# Patient Record
(demographics unavailable — no encounter records)

---

## 2017-03-27 NOTE — CT
EXAM DATE: 17



PATIENT'S AGE: 60



Patient: JC NUNES



Facility: [place], [place]

Patient ID: [id number]

Site Patient ID: [id number].

Site Accession #: [id number]

: 1956

Study: CT Head [id number]/27/2017 5:56:27 AM

Ordering Physician: Doctor Braswell



Final Report: 

INDICATION: DIZZINESS/NEAR SYNCOPE, LT SIDED WEAKNESS



TECHNIQUE:

CT Head without contrast.



COMPARISON:

None.



FINDINGS:

CSF spaces: Within normal limits for age.

Brain parenchyma: The gray-white differentiation is normal. No sign of mass, 
hemorrhage, or midline shift.

Skull base and calvarium: Mucosal thickening is present in the maxillary 
sinuses. The visualized orbits are grossly unremarkable. No skull fractures.



IMPRESSION:

Unremarkable noncontrast head CT. No sign of CVA or other significant finding.



Dictated by: Neel Almaraz MD @ 2017 06:16:47

(Electronic Signature)



Report Signed by Proxy and Original Signed Document filed in the

Medical Record.
[facility]

## 2017-03-27 NOTE — CR
EXAM DATE: 17



PATIENT'S AGE: 60



Patient: JC NUNES



Facility: [place], [place]

Patient ID: [id number]

Site Patient ID: [id number].

Site Accession #: [id number]

: 1956

Study: XRay Chest [id number]/27/2017 5:55:26 AM

Ordering Physician: Doctor Braswell



Final Report: 

INDICATION: LT ARM WEAKNESS, DIZZINESS/NEAR SYNCOPE



TECHNIQUE:

Chest 1 view. 



COMPARISON:

2014



FINDINGS:

Cardiovascular and mediastinum: Heart size and vasculature are normal in 
caliber and appearance. Mediastinum is within normal limits. 



Lungs and pleural space: Lungs are clear. No sign of infiltrate or mass. No 
sign of pleural effusion. No pneumothorax. 



Bones and soft tissues: No significant findings. 



IMPRESSION:

Unremarkable chest.



Dictated by: Neel Almaraz MD @ 2017 06:08:56

(Electronic Signature)



 

Report Signed by Proxy and Original Signed Document filed in the

Medical Record.
[facility]DD

## 2017-03-27 NOTE — PCM.HP
H&P History of Present Illness





- General


Date of Service: 03/27/17


Admit Problem/Dx: 


 Admission Diagnosis/Problem





Admission Diagnosis/Problem      Near syncope








Source of Information: Patient, Old records, Provider





- History of Present Illness


Initial Comments - Free Text/Narative: 





He was seen in the ED this am . He states that recently he has had a sore 

throat and cough. His cough has been productive of discolored sputum


He is a . He stopped and felt nauseated. He threw up and felt very 

dizzy. He had a blood pressure cuff in the 


cab of his truck and took his blood pressure. It was 88 mm Hg systolic.





 He has a history of HTN and takes Hyzaar. He also takes flomax.


 He sometimes skips his blood pressure medicine if his blood pressure is low.





His last dose of Hyzaar was on Saturday. He takes Flomax for prostate problems. 

He denies pain. He denies fever or diarrhea.





- Related Data


Allergies/Adverse Reactions: 


 Allergies











Allergy/AdvReac Type Severity Reaction Status Date / Time


 


hydrocodone Allergy  Vomiting Verified 03/27/17 05:16











Home Medications: 


 Home Meds





Losartan/Hydrochlorothiazide [Losartan-HCTZ 100-25 MG] 1 tab PO DAILY 03/27/17 [

History]


Tamsulosin [Flomax] 0.4 mg PO PCDINNER 03/27/17 [History]


amLODIPine [Norvasc] 5 mg PO DAILY 03/27/17 [History]











Past Medical History


HEENT History: Reports: Impaired vision


Cardiovascular History: Reports: Hypertension.  Denies: Bypass, CAD, 

Cardiomyopathy, Heart Failure, MI


Respiratory History: Reports: None.  Denies: COPD


Gastrointestinal History: Denies: Cirrhosis


Genitourinary History: Reports: BPH, Urinary incontinence, Other (see below) (

He states that he has had problems with urinary frequency related to prostate 

trouble. He has noticed improvement with Flomax.)


Musculoskeletal History: Reports: Amputation


Neurological History: Denies: Alzheimers disease, CVA


Endocrine/Metabolic History: Denies: Diabetes, type I, Diabetes, type II


Oncologic (Cancer) History: Reports: None





- Past Surgical History


Male  Surgical History: Reports: Circumcision


Other Male  Surgeries/Procedures: bladder surgery


Musculoskeletal Surgical History: Reports: Amputation, Other (see below)


Other Musculoskeletal Surgeries/Procedures:: left finger; knee surgery





Social & Family History





- Family History


Family Medical History: Noncontributory





- Tobacco Use


Smoking Status *Q: Never Smoker


Second Hand Smoke Exposure: No





- Caffeine Use


Caffeine Use: Reports: None





- Alcohol Use


Days Per Week of Alcohol Use: 0





- Recreational Drug Use


Recreational Drug Use: No





H&P Review of Systems





- Review of Systems:


Review Of Systems: See Below


General: Denies: fever, chills


HEENT: Reports: sore throat.  Denies: dysphasia, sinus congestion


Pulmonary: Reports: Other (He notes that he generally does not feel as well 

when he takes his blood pressure medicine. Sometimes  he thinks it makes him 

tired.)


Gastrointestinal: Denies: Abdominal pain


Genitourinary: Denies: dysuria, hematuria


Skin: Denies: cyanosis


Psychiatric: Denies: confusion





Exam





- Exam


Exam: See Below





- Vital Signs


Vital Signs: 


 Last Vital Signs











Temp  96.6 F   03/27/17 05:16


 


Pulse  90   03/27/17 07:30


 


Resp  16   03/27/17 07:30


 


BP  108/74   03/27/17 07:30


 


Pulse Ox  98   03/27/17 07:30








 





Orthostatic Blood Pressure [     93/59


Standing]                        


Orthostatic Blood Pressure [     108/68


Sitting]                         


Orthostatic Blood Pressure [     114/70


Supine]                          








Weight: 77.111 kg





- Exam


General: alert, oriented, sedated


HEENT: EOMI


Neck: supple, trachea midline


Lungs: Clear to auscultation, Normal respiratory effort


Cardiovascular: regular rate, regular rhythm


Abdomen: soft, other (Mild tenderness along the costal margin bilaterally)


Extremities: No: edema


Neurological: cranial nerves intact, normal speech


Neuro Extensive - Motor, Sensory, Reflexes: No: facial palsy (L), facial palsy (

R), hemiplagia (L), abnormal Romberg


Psychiatric: No: agitated





- Patient Data


Result Diagrams: 


 03/27/17 05:20





 03/27/17 05:20





*Q Meaningful Use (ADM)





- VTE *Q


VTE Criteria *Q: 








- Stroke *Q


Stroke Criteria *Q: 








- AMI *Q


AMI Criteria *Q: 








- Problem List


(1) Near syncope


SNOMED Code(s): 947473408


   ICD Code: R55 - SYNCOPE AND COLLAPSE   Status: Acute   Current Visit: Yes   


Problem List Initiated/Reviewed/Updated: Yes


Orders Last 24hrs: 


 Active Orders 24 hr











 Category Date Time Status


 


 Oxygen Therapy [RC] PRN Care  03/27/17 09:41 Ordered


 


 VTE/DVT Education [RC] PER UNIT ROUTINE Care  03/27/17 09:41 Ordered


 


 Vital Signs [RC] Q4H Care  03/27/17 09:41 Ordered


 


 Regular Diet [DIET] Diet  03/27/17 Lunch Active


 


 CBC WITH AUTO DIFF [HEME] AM Lab  03/28/17 05:11 Ordered


 


 COMPREHENSIVE METABOLIC PN,CMP [CHEM] AM Lab  03/28/17 05:11 Ordered


 


 MAGNESIUM [CHEM] Routine Lab  03/27/17 09:41 Ordered


 


 STREP SCRN A RAPID W CULT CONF [RM] Stat Lab  03/27/17 09:39 Uncollected


 


 Acetaminophen [Tylenol] Med  03/27/17 09:41 Ordered





 650 mg PO Q4H PRN   


 


 Bisacodyl [Dulcolax] Med  03/27/17 09:41 Ordered





 5 mg PO DAILY PRN   


 


 Levofloxacin/Dextrose 5%-Water [Levaquin in D5W 500 MG/ Med  03/27/17 09:45 

Active





 100 ML] 500 mg   





 Premix Bag 1 bag   





 IV Q24H   


 


 Ondansetron [Zofran] Med  03/27/17 09:41 Ordered





 4 mg IVPUSH Q4H PRN   


 


 Sodium Chloride 0.9% [Normal Saline] 1,000 ml Med  03/27/17 09:45 Active





 IV ASDIRECTED   


 


 Tamsulosin [Flomax] Med  03/27/17 18:00 Active





 0.4 mg PO PCDINNER   


 


 Temazepam [Restoril] Med  03/27/17 09:41 Ordered





 15 mg PO BEDTIME PRN   


 


 Resuscitation Status Routine Resus Stat  03/27/17 09:41 Ordered








 Medication Orders





Levofloxacin/Dextrose 500 mg/ (Premix)  100 mls @ 100 mls/hr IV Q24H [facility]


Sodium Chloride (Normal Saline)  1,000 mls @ 150 mls/hr IV ASDIRECTED [facility]


Tamsulosin HCl (Flomax)  0.4 mg PO PCDINNER [facility]








Assessment/Plan Comment:: 





Will check a throat swab. I suspect his transient hypotension may have been 

multifactorial. It occurred after vomiting. It may be that he had a vagal 

reaction. Also contribute factors may be an acute illness and his use of 

antihypertensives. Will monitor on observation with telemetry. As he's had a 

recent cough productive of discolored sputum Will cover with Levaquin as per 

orders.

## 2017-03-27 NOTE — EDM.PDOC
ED HPI GENERAL MEDICAL PROBLEM





- General


Chief Complaint: Syncope


Stated Complaint: AMBULANCE


Time Seen by Provider: 03/27/17 06:02





- History of Present Illness


INITIAL COMMENTS - FREE TEXT/NARRATIVE: 





HISTORY AND PHYSICAL:





History of present illness:


Patient is 60-year-old white male who presents with return of near syncope he 

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 

felt like his pass out he checked his blood pressure was 80/40 at that time he 

was diaphoretic nauseous did not vomit denied chest pain abdominal pain or 

other concern





Review of systems: 


As per history of present illness and below otherwise all systems reviewed and 

negative.





Past medical history: 


As per history of present illness and as reviewed below otherwise 

noncontributory.





Surgical history: 


As per history of present illness and as reviewed below otherwise 

noncontributory.





Social history: 


No reported history of drug or alcohol abuse.





Family history: 


As per history of present illness and as reviewed below otherwise 

noncontributory.





Physical exam:


HEENT: Atraumatic, normocephalic, pupils reactive, negative for conjunctival 

pallor or scleral icterus, mucous membranes moist, throat clear, neck supple, 

nontender, trachea midline.


Lungs: Clear to auscultation, breath sounds equal bilaterally, chest nontender.


Heart: S1S2, regular, negative for clicks, rubs, or JVD.


Abdomen: Soft, nondistended, nontender. Negative for masses or 

hepatosplenomegaly. Negative for costovertebral tenderness.


Pelvis: Stable nontender.


Genitourinary: Deferred.


Rectal: Deferred.


Extremities: Atraumatic, negative for cords or calf pain. Neurovascular 

unremarkable.


Neuro: Awake, alert, oriented. Cranial nerves II through XII unremarkable. 

Cerebellum unremarkable. Motor and sensory unremarkable throughout. Exam 

nonfocal.





Diagnostics:


CBC CMP troponin PT INR EKG chest x-ray CT brain





Therapeutics:


Normal saline 1 L bolus O2 monitor





Impression: 


#1 near syncope





Definitive disposition and diagnosis as appropriate pending reevaluation and 

review of above.


Treatments PTA: Reports: IV/IO, Oxygen





- Related Data


 Allergies











Allergy/AdvReac Type Severity Reaction Status Date / Time


 


hydrocodone Allergy  Vomiting Verified 03/27/17 05:16











Home Meds: 


 Home Meds





Losartan/Hydrochlorothiazide [Losartan-HCTZ 100-25 MG] 1 mg PO DAILY 03/27/17 [

History]


Tamsulosin [Flomax] 0.4 mg PO DAILY 03/27/17 [History]


amLODIPine [Norvasc] 5 mg PO DAILY 03/27/17 [History]











Past Medical History


HEENT History: Reports: Impaired vision


Cardiovascular History: Reports: Hypertension





- Past Surgical History


Other Male  Surgeries/Procedures: bladder surgery


Musculoskeletal Surgical History: Reports: Amputation, Other (see below)


Other Musculoskeletal Surgeries/Procedures:: left finger; knee surgery





Social & Family History





- Family History


Family Medical History: Noncontributory





- Tobacco Use


Smoking Status *Q: Never Smoker


Second Hand Smoke Exposure: No





- Caffeine Use


Caffeine Use: Reports: None





- Alcohol Use


Days Per Week of Alcohol Use: 0





- Recreational Drug Use


Recreational Drug Use: No





ED ROS GENERAL





- Review of Systems


Review Of Systems: ROS reveals no pertinent complaints other than HPI.





ED EXAM, GENERAL





- Physical Exam


Exam: See Below (See dictated)





Course





- Vital Signs


Last Recorded V/S: 





 Last Vital Signs











Temp  35.9 C   03/27/17 05:16


 


Pulse  69   03/27/17 05:55


 


Resp  18   03/27/17 05:55


 


BP  119/71   03/27/17 05:55


 


Pulse Ox  98   03/27/17 05:55














- Orders/Labs/Meds


Orders: 





 Active Orders 24 hr











 Category Date Time Status


 


 EKG 12 Lead [EKG Documentation Completion] [RC] STAT Care  03/27/17 05:07 

Active


 


 Chest 1V Frontal [CR] Stat Exams  03/27/17 05:05 Taken


 


 Head wo Cont [CT] Stat Exams  03/27/17 05:05 Taken











Labs: 





 Laboratory Tests











  03/27/17 03/27/17 03/27/17 Range/Units





  05:20 05:20 05:20 


 


WBC  7.34    (4.0-11.0)  K/uL


 


RBC  5.12    (4.50-5.90)  M/uL


 


Hgb  15.5    (13.0-17.0)  g/dL


 


Hct  45.0    (38.0-50.0)  %


 


MCV  87.9    (80.0-98.0)  fL


 


MCH  30.3    (27.0-32.0)  pg


 


MCHC  34.4    (31.0-37.0)  g/dL


 


RDW Std Deviation  40.2    (28.0-62.0)  fl


 


RDW Coeff of Vic  13    (11.0-15.0)  %


 


Plt Count  211    (150-400)  K/uL


 


MPV  9.70    (7.40-12.00)  fL


 


Neut % (Auto)  72.9    (48.0-80.0)  %


 


Lymph % (Auto)  11.0 L    (16.0-40.0)  %


 


Mono % (Auto)  14.2    (0.0-15.0)  %


 


Eos % (Auto)  1.5    (0.0-7.0)  %


 


Baso % (Auto)  0.4    (0.0-1.5)  %


 


Neut # (Auto)  5.4    (1.4-5.7)  K/uL


 


Lymph # (Auto)  0.8    (0.6-2.4)  K/uL


 


Mono # (Auto)  1.0 H    (0.0-0.8)  K/uL


 


Eos # (Auto)  0.1    (0.0-0.7)  K/uL


 


Baso # (Auto)  0.0    (0.0-0.1)  K/uL


 


Nucleated RBC %  0.0    /100WBC


 


Nucleated RBCs #  0    K/uL


 


Sodium   138   (136-146)  mmol/L


 


Potassium   3.8   (3.5-5.1)  mmol/L


 


Chloride   102   ()  mmol/L


 


Carbon Dioxide   22   (21-31)  mmol/L


 


BUN   16   (6.0-23.0)  mg/dL


 


Creatinine   1.4   (0.6-1.5)  mg/dL


 


Est Cr Clr Drug Dosing   54.29   mL/min


 


Estimated GFR (MDRD)   51.7   ml/min


 


Glucose   193 H   ()  mg/dL


 


Calcium   9.1   (8.8-10.8)  mg/dL


 


Total Bilirubin   1.0   (0.1-1.5)  mg/dL


 


AST   18   (5-40)  IU/L


 


ALT   20   (8-54)  IU/L


 


Alkaline Phosphatase   81   ()  


 


Troponin I    < 0.10  (0.0-0.29)  NG/ML


 


Total Protein   6.9   (6.0-8.0)  g/dL


 


Albumin   4.0   (3.4-4.8)  g/dL


 


Globulin   2.9   (2.0-3.5)  g/dL


 


Albumin/Globulin Ratio   1.4   (1.3-2.8)  














Departure





- Departure


Time of Disposition: 07:04


Disposition: Refer to Observation


Condition: good


Clinical Impression: 


 Near syncope





Forms:  ED Department Discharge





- My Orders


Last 24 Hours: 





My Active Orders





03/27/17 05:05


Chest 1V Frontal [CR] Stat 


Head wo Cont [CT] Stat 





03/27/17 05:07


EKG 12 Lead [EKG Documentation Completion] [RC] STAT 














- Assessment/Plan


Last 24 Hours: 





My Active Orders





03/27/17 05:05


Chest 1V Frontal [CR] Stat 


Head wo Cont [CT] Stat 





03/27/17 05:07


EKG 12 Lead [EKG Documentation Completion] [RC] STAT

## 2017-03-28 NOTE — PCM.DCSUM1
**Discharge Summary





- Hospital Course


Brief History: he was admitted with a hypotensive episode associated with 

vomiting and presyncope.





- Discharge Data


Discharge Date: 03/28/17


Discharge Disposition: Home, Self-Care 01


Condition: Fair





- Discharge Diagnosis/Problem(s)


(1) Near syncope


SNOMED Code(s): 604271437


   ICD Code: R55 - SYNCOPE AND COLLAPSE   Status: Acute   Current Visit: Yes   





- Patient Summary/Data


Hospital Course: 





His antihypertensives were held. He was given IVF hydration


He is improved at discharge and is asymptomatic at discharge and  he is 

normotensive.


He has been started on levaquin po for bronchitis


telemetry showed NSR with rare PVC reported.





Impression:


hypotension transient with likely multifactorial etiology


history of HTN


history of flomax use for nocturia thought related to prostate enlargement


bronchitis





Plan


follow up with Dr Salvador


home blood pressure monitoring


levaquin 500 mg po daily x 6 days


off work ( he is a  ) until Monday April 3, 2017.


stop hyzaar for now





Aly Bledsoe MD





- Discharge Plan


Home Medications: 


 Home Meds





Losartan/Hydrochlorothiazide [Losartan-HCTZ 100-25 MG] 1 tab PO DAILY 03/27/17 [

History]


Tamsulosin [Flomax] 0.8 mg PO PCDINNER 03/27/17 [History]


amLODIPine [Norvasc] 5 mg PO DAILY 03/27/17 [History]








Forms:  ED Department Discharge


Referrals: 


PCP,None [Primary Care Provider] - 





- Patient Data


Vitals - Most Recent: 


 Last Vital Signs











Temp  98.6 F   03/28/17 11:24


 


Pulse  75   03/28/17 11:24


 


Resp  16   03/28/17 11:24


 


BP  133/79   03/28/17 11:24


 


Pulse Ox  96   03/28/17 07:54








 





Orthostatic Blood Pressure [     93/59


Standing]                        


Orthostatic Blood Pressure [     108/68


Sitting]                         


Orthostatic Blood Pressure [     114/70


Supine]                          








Weight - Most Recent: 77.111 kg


I&O - Last 24 hours: 


 Intake & Output











 03/27/17 03/28/17 03/28/17





 22:59 06:59 14:59


 


Intake Total 425 1300 1000


 


Output Total 175 765 


 


Balance 











Lab Results - Last 24 hrs: 


 Laboratory Results - last 24 hr











  03/28/17 03/28/17 Range/Units





  05:00 05:00 


 


WBC  8.49   (4.0-11.0)  K/uL


 


RBC  4.51   (4.50-5.90)  M/uL


 


Hgb  13.5   (13.0-17.0)  g/dL


 


Hct  40.0   (38.0-50.0)  %


 


MCV  88.7   (80.0-98.0)  fL


 


MCH  29.9   (27.0-32.0)  pg


 


MCHC  33.8   (31.0-37.0)  g/dL


 


RDW Std Deviation  40.8   (28.0-62.0)  fl


 


RDW Coeff of Vic  13   (11.0-15.0)  %


 


Plt Count  179   (150-400)  K/uL


 


MPV  9.60   (7.40-12.00)  fL


 


Add Manual Diff  YES   


 


Neutrophils % (Manual)  66   (48.0-80.0)  %


 


Lymphocytes % (Manual)  24   (16.0-40.0)  %


 


Monocytes % (Manual)  7   (0.0-15.0)  %


 


Eosinophils % (Manual)  2   (0.0-7.0)  %


 


Basophils % (Manual)  1   (0.0-1.5)  %


 


Nucleated RBC %  0.0   /100WBC


 


Absolute Seg Neuts  5.6   


 


Lymphocytes # (Manual)  2.0   


 


Monocytes # (Manual)  0.6   


 


Eosinophils # (Manual)  0.2   


 


Basophils # (Manual)  0   


 


Nucleated RBCs #  0   K/uL


 


Sodium   140  (136-146)  mmol/L


 


Potassium   3.5  (3.5-5.1)  mmol/L


 


Chloride   111 H  ()  mmol/L


 


Carbon Dioxide   21  (21-31)  mmol/L


 


BUN   20  (6.0-23.0)  mg/dL


 


Creatinine   0.9  (0.6-1.5)  mg/dL


 


Est Cr Clr Drug Dosing   84.44  mL/min


 


Estimated GFR (MDRD)   > 60.0  ml/min


 


Glucose   93  ()  mg/dL


 


Calcium   7.2 L  (8.8-10.8)  mg/dL


 


Total Bilirubin   0.8  (0.1-1.5)  mg/dL


 


AST   15  (5-40)  IU/L


 


ALT   16  (8-54)  IU/L


 


Alkaline Phosphatase   63  ()  


 


Total Protein   5.1 L  (6.0-8.0)  g/dL


 


Albumin   3.2 L  (3.4-4.8)  g/dL


 


Globulin   1.9 L  (2.0-3.5)  g/dL


 


Albumin/Globulin Ratio   1.7  (1.3-2.8)  











JULITA Results - Last 24 hrs: 


 Microbiology











 03/27/17 10:00 Group A Streptococcus Rapid Screen - Final





 Throat    NEGATIVE STREP A SCREEN











Med Orders - Current: 


 Current Medications





Acetaminophen (Tylenol)  650 mg PO Q4H PRN


   PRN Reason: Pain (Mild 1-3)/fever


Bisacodyl (Dulcolax)  5 mg PO DAILY PRN


   PRN Reason: Constipation


Sodium Chloride (Normal Saline)  1,000 mls @ 150 mls/hr IV ASDIRECTED [facility]


   Last Admin: 03/28/17 08:12 Dose:  150 mls/hr


Levofloxacin (Levaquin)  500 mg PO Q24H [facility]


   Last Admin: 03/28/17 10:55 Dose:  500 mg


Ondansetron HCl (Zofran)  4 mg IVPUSH Q4H PRN


   PRN Reason: Nausea


Tamsulosin [facility] ([facility]omax)  [facility]


Temazepam (Restoril)  15 mg PO BEDTIME PRN


   PRN Reason: Sleep





Discontinued Medications





Levofloxacin/Dextrose 500 mg/ (Premix)  100 mls @ 100 mls/hr IV Q24H [facility]


   Last Admin: 03/27/17 10:00 Dose:  100 mls/hr


Tamsulosin HCl (Flomax)  [facility]


   Last Admin: 03/27/17 18:18 Dose:  0.4 mg


Tamsulosin HCl (Flomax)  0.4 mg PO ONETIME ONE


   Stop: 03/27/17 18:25


   Last Admin: 03/27/17 18:51 Dose:  0.4 mg











*Q Meaningful Use (DIS)





- VTE *Q


VTE Criteria *Q: 








- Stroke *Q


Stroke Criteria *Q: 








- AMI *Q


AMI Criteria *Q: